# Patient Record
Sex: FEMALE | Race: OTHER
[De-identification: names, ages, dates, MRNs, and addresses within clinical notes are randomized per-mention and may not be internally consistent; named-entity substitution may affect disease eponyms.]

---

## 2018-11-29 ENCOUNTER — APPOINTMENT (OUTPATIENT)
Dept: NEUROLOGY | Facility: CLINIC | Age: 18
End: 2018-11-29

## 2019-07-24 ENCOUNTER — HOSPITAL ENCOUNTER (EMERGENCY)
Dept: HOSPITAL 74 - JERFT | Age: 19
Discharge: HOME | End: 2019-07-24
Payer: COMMERCIAL

## 2019-07-24 VITALS — BODY MASS INDEX: 23.1 KG/M2

## 2019-07-24 VITALS — SYSTOLIC BLOOD PRESSURE: 108 MMHG | HEART RATE: 83 BPM | TEMPERATURE: 98.6 F | DIASTOLIC BLOOD PRESSURE: 72 MMHG

## 2019-07-24 DIAGNOSIS — Z94.0: ICD-10-CM

## 2019-07-24 DIAGNOSIS — R51: Primary | ICD-10-CM

## 2019-07-24 NOTE — PDOC
History of Present Illness





- General


Chief Complaint: Headache


Stated Complaint: HEADACHE


Time Seen by Provider: 07/24/19 10:11


History Source: Patient, Parent(s)





- History of Present Illness


Timing/Duration: reports: other





Past History





- Past Medical History


Allergies/Adverse Reactions: 


 Allergies











Allergy/AdvReac Type Severity Reaction Status Date / Time


 


No Known Allergies Allergy   Verified 07/24/19 10:13











Home Medications: 


Ambulatory Orders





NK [No Known Home Medication]  07/24/19 








COPD: No


Kidney Stones:  (only has one kidney)


Other medical history: migranes





- Surgical History


Abdominal Surgery: Yes (kidney transplant, re-implantation of bladder as an 

infant)





- Suicide/Smoking/Psychosocial Hx


Smoking History: Never smoked


Hx Alcohol Use: No


Drug/Substance Use Hx: No





**Review of Systems





- Review of Systems


HEENTM: No: Blurred Vision


Neurological: Yes: Headache.  No: Numbness, Weakness, Dizziness





*Physical Exam





- Vital Signs


 Last Vital Signs











Temp Pulse Resp BP Pulse Ox


 


 98.6 F   83   18   108/72   98 


 


 07/24/19 10:10  07/24/19 10:10  07/24/19 10:10  07/24/19 10:10  07/24/19 10:10














- Physical Exam


General Appearance: Yes: Appropriately Dressed.  No: Apparent Distress


HEENT: positive: Normal Voice


Neck: positive: Supple


Respiratory/Chest: negative: Respiratory Distress


Neurologic: positive: CNs II-XII NML intact, Fully Oriented, Alert, Normal Mood/

Affect, Motor Strength 5/5





Medical Decision Making





- Medical Decision Making





07/24/19 10:25


18 yo F, endorses history of migraines with negative neuroimaging in the past, 

follows up with neuro and currently on medication for migraine to take as needed

, but states medication has never relieved her headaches.  Does not remember 

name of meds at this point.  Came in today because started having pressure in 

the back of her head since last night that felt somewhat different from her 

migraines though admits that her her migraines is usually located in different 

locations.  No dizziness, nausea, vomiting or visual changes at this time.  Pt 

states her neuro recommended botox but pt and mother currently refusing 

procedure. States she gets almost daily HAs. Did not try anything new for 

current headache





See exam





HA


H/o migraines


Neg neuroimaging in past


Gets almost daily HAs


On home meds prn HA but does not help


Refusing botox rec by her neuro


Well goldy w/ intact neuro exam


-dose of motrin given here


-To f/u with her neurologist





*DC/Admit/Observation/Transfer


Diagnosis at time of Disposition: 


 H/O migraine





Headache


Qualifiers:


 Headache type: unspecified Headache chronicity pattern: chronic headache 

Intractability: not intractable Qualified Code(s): R51 - Headache








- Discharge Dispostion


Disposition: HOME


Condition at time of disposition: Good





- Referrals





- Patient Instructions


Additional Instructions: 


Take motrin, tylenol or exedrine for headache as needed and follow up with your 

neurologist this week for further management





- Post Discharge Activity

## 2021-01-19 ENCOUNTER — TRANSCRIPTION ENCOUNTER (OUTPATIENT)
Age: 21
End: 2021-01-19

## 2021-01-19 ENCOUNTER — APPOINTMENT (OUTPATIENT)
Dept: GASTROENTEROLOGY | Facility: CLINIC | Age: 21
End: 2021-01-19
Payer: COMMERCIAL

## 2021-01-19 VITALS
BODY MASS INDEX: 23.56 KG/M2 | DIASTOLIC BLOOD PRESSURE: 72 MMHG | HEART RATE: 106 BPM | OXYGEN SATURATION: 98 % | SYSTOLIC BLOOD PRESSURE: 106 MMHG | HEIGHT: 64 IN | TEMPERATURE: 98.9 F | WEIGHT: 138 LBS

## 2021-01-19 DIAGNOSIS — K25.9 GASTRIC ULCER, UNSPECIFIED AS ACUTE OR CHRONIC, W/OUT HEMORRHAGE OR PERFORATION: ICD-10-CM

## 2021-01-19 PROCEDURE — 99072 ADDL SUPL MATRL&STAF TM PHE: CPT

## 2021-01-19 PROCEDURE — 99204 OFFICE O/P NEW MOD 45 MIN: CPT | Mod: 25

## 2021-01-19 PROCEDURE — 36415 COLL VENOUS BLD VENIPUNCTURE: CPT

## 2021-01-19 NOTE — PHYSICAL EXAM
[General Appearance - Alert] : alert [General Appearance - In No Acute Distress] : in no acute distress [Sclera] : the sclera and conjunctiva were normal [Outer Ear] : the ears and nose were normal in appearance [Neck Appearance] : the appearance of the neck was normal [] : no respiratory distress [Abdomen Soft] : soft [Abnormal Walk] : normal gait [Cranial Nerves] : cranial nerves 2-12 were intact [Skin Color & Pigmentation] : normal skin color and pigmentation [Oriented To Time, Place, And Person] : oriented to person, place, and time

## 2021-01-19 NOTE — ASSESSMENT
[FreeTextEntry1] : Will check labs as listed, r/o secondary causes of gastric ulcers such as a gastrinoma.\par \par Will plan on an upper endoscopy for gastric ulcers.  Explained risks/benefits/alternatives including not limited to bleeding, infection, perforation, missed lesions, anesthesia complications.  Patient understands and agrees, all questions answered.\par

## 2021-01-19 NOTE — HISTORY OF PRESENT ILLNESS
[FreeTextEntry1] : Ms. Person is a pleasant 20F h/o kidney/bladder surgery as a child who presents to [facility] care.  She states she was seen by Dr. Elizabeth Mehta in November 2020 for burning, at times severe upper abdominal pain. She underwent an EGD which as per her report showed 3 gastric ulcers. She was not told of an H. pylor infection or other abnormalities on biopsy.\par \par She was placed on pantoprazole without improvement in her symptoms.\par \par She does have nausea when the pain is severe, no vomiting.  Occasional alternating constipation or diarrhea, no black stool or blood in her stool, no mucus.\par \par No weight change.\par \par No NSAIDs.\par \par Does not smoke or drink.\par \par No family h/o any GI malignancies.\par \par She stopped her pantoprazole several weeks ago due to lack of improvement in her symptoms.\par \par She was told she would need a repeat EGD to confirm healing, however wanted a second opinion.\par \par She is seeking a new PCP.

## 2021-01-20 LAB
ALBUMIN SERPL ELPH-MCNC: 4.9 G/DL
ALP BLD-CCNC: 77 U/L
ALT SERPL-CCNC: 22 U/L
ANION GAP SERPL CALC-SCNC: 14 MMOL/L
AST SERPL-CCNC: 24 U/L
BASOPHILS # BLD AUTO: 0.05 K/UL
BASOPHILS NFR BLD AUTO: 0.6 %
BILIRUB SERPL-MCNC: 0.4 MG/DL
BUN SERPL-MCNC: 11 MG/DL
CALCIUM SERPL-MCNC: 10 MG/DL
CHLORIDE SERPL-SCNC: 105 MMOL/L
CO2 SERPL-SCNC: 23 MMOL/L
CREAT SERPL-MCNC: 0.72 MG/DL
EOSINOPHIL # BLD AUTO: 0.15 K/UL
EOSINOPHIL NFR BLD AUTO: 1.9 %
GLUCOSE SERPL-MCNC: 97 MG/DL
HCT VFR BLD CALC: 46.7 %
HGB BLD-MCNC: 14.9 G/DL
IMM GRANULOCYTES NFR BLD AUTO: 0.3 %
LYMPHOCYTES # BLD AUTO: 2.01 K/UL
LYMPHOCYTES NFR BLD AUTO: 26.1 %
MAN DIFF?: NORMAL
MCHC RBC-ENTMCNC: 29.2 PG
MCHC RBC-ENTMCNC: 31.9 GM/DL
MCV RBC AUTO: 91.6 FL
MONOCYTES # BLD AUTO: 0.51 K/UL
MONOCYTES NFR BLD AUTO: 6.6 %
NEUTROPHILS # BLD AUTO: 4.97 K/UL
NEUTROPHILS NFR BLD AUTO: 64.5 %
PLATELET # BLD AUTO: 402 K/UL
POTASSIUM SERPL-SCNC: 4.3 MMOL/L
PROT SERPL-MCNC: 7.4 G/DL
RBC # BLD: 5.1 M/UL
RBC # FLD: 13 %
SODIUM SERPL-SCNC: 142 MMOL/L
WBC # FLD AUTO: 7.71 K/UL

## 2021-01-25 LAB — GASTRIN SERPL-MCNC: 33 PG/ML

## 2021-01-27 LAB — CGA SERPL-MCNC: 36.5 NG/ML

## 2021-02-20 ENCOUNTER — RESULT REVIEW (OUTPATIENT)
Age: 21
End: 2021-02-20

## 2021-02-22 ENCOUNTER — RESULT REVIEW (OUTPATIENT)
Age: 21
End: 2021-02-22

## 2021-02-23 ENCOUNTER — RESULT REVIEW (OUTPATIENT)
Age: 21
End: 2021-02-23

## 2021-02-23 ENCOUNTER — APPOINTMENT (OUTPATIENT)
Dept: GASTROENTEROLOGY | Facility: HOSPITAL | Age: 21
End: 2021-02-23

## 2021-07-26 ENCOUNTER — APPOINTMENT (OUTPATIENT)
Dept: OTOLARYNGOLOGY | Facility: CLINIC | Age: 21
End: 2021-07-26

## 2022-03-08 ENCOUNTER — TRANSCRIPTION ENCOUNTER (OUTPATIENT)
Age: 22
End: 2022-03-08

## 2022-04-04 ENCOUNTER — APPOINTMENT (OUTPATIENT)
Dept: GASTROENTEROLOGY | Facility: CLINIC | Age: 22
End: 2022-04-04

## 2023-01-17 ENCOUNTER — APPOINTMENT (OUTPATIENT)
Dept: INTERNAL MEDICINE | Facility: CLINIC | Age: 23
End: 2023-01-17

## 2023-08-04 ENCOUNTER — APPOINTMENT (OUTPATIENT)
Dept: GASTROENTEROLOGY | Facility: CLINIC | Age: 23
End: 2023-08-04
Payer: COMMERCIAL

## 2023-08-04 VITALS
DIASTOLIC BLOOD PRESSURE: 70 MMHG | SYSTOLIC BLOOD PRESSURE: 110 MMHG | BODY MASS INDEX: 23.56 KG/M2 | WEIGHT: 138 LBS | HEIGHT: 64 IN

## 2023-08-04 PROCEDURE — 99214 OFFICE O/P EST MOD 30 MIN: CPT

## 2023-08-04 NOTE — ASSESSMENT
[FreeTextEntry1] : Very likely symptomatic cholelithiasis.  Will start on omeprazole 20mg daily to r/o acid related disease.  Will refer to Dr. Castillo for evaluation for a cholecystectomy.

## 2023-08-04 NOTE — HISTORY OF PRESENT ILLNESS
[FreeTextEntry1] : Ms. Person is a pleasant 23F h/o kidney/bladder surgery as a child, ? gastric ulcer several years ago who returns for f/u.  She was seen by myself 1/21, underwent an EGD 2/21 showing only a fundic gland polyp, no H. pylori on biopsies.  She gained some weight over the past year. Has been c/o epigastric pain occasionally radiating to the RUQ starting 3/23, has been persistent since, sharp, not colicky.  Worsened by fatty food, improves only with sparkling water.  No jaundice, icterus, dark urine, pale stool.  No N/V.  Has not tried any acid lowering medication.  No other changes.  Had an abd U/S (reviewed) on 7/10 showing cholelithiasis, otherwise unremarkable.

## 2023-08-15 ENCOUNTER — NON-APPOINTMENT (OUTPATIENT)
Age: 23
End: 2023-08-15

## 2023-08-15 ENCOUNTER — APPOINTMENT (OUTPATIENT)
Dept: SURGERY | Facility: CLINIC | Age: 23
End: 2023-08-15
Payer: COMMERCIAL

## 2023-08-15 VITALS
WEIGHT: 156 LBS | OXYGEN SATURATION: 98 % | BODY MASS INDEX: 26.63 KG/M2 | DIASTOLIC BLOOD PRESSURE: 79 MMHG | RESPIRATION RATE: 18 BRPM | HEIGHT: 64 IN | HEART RATE: 80 BPM | SYSTOLIC BLOOD PRESSURE: 115 MMHG

## 2023-08-15 DIAGNOSIS — K80.20 CALCULUS OF GALLBLADDER W/OUT CHOLECYSTITIS W/OUT OBSTRUCTION: ICD-10-CM

## 2023-08-15 PROCEDURE — 99203 OFFICE O/P NEW LOW 30 MIN: CPT

## 2023-08-17 PROBLEM — K80.20 SYMPTOMATIC CHOLELITHIASIS: Status: ACTIVE | Noted: 2023-08-04

## 2023-08-17 NOTE — PHYSICAL EXAM
[Abdomen Masses] : No abdominal masses [Abdomen Tenderness] : ~T No ~M abdominal tenderness [JVD] : no jugular venous distention  [No Rash or Lesion] : No rash or lesion [Alert] : alert [de-identified] : No acute distress

## 2023-08-17 NOTE — ASSESSMENT
[FreeTextEntry1] :  Ms. RAY is a 23 year female who presents for symptomatic cholelithiasis.   Based on current symptoms and prior findings of gallstones on imaging recommend proceeding with laparoscopic cholecystectomy. Discussed operative approaches to cholecystectomy including open, laparoscopic and robotic surgery. Patient would like to proceed with laparoscopic cholecystectomy. Risks and benefits of surgery discussed including infection, bleeding, bile duct injury, bile leak. Long term risks discussed include postprandial pain, incisional hernia and bile salt induced diarrhea.  Patients questions answered to her satisfaction.   Will get presurgical testing and labs for preoperative risk assessment.  Will schedule for laparoscopic cholecystectomy, possible open surgery.

## 2023-08-17 NOTE — DATA REVIEWED
[FreeTextEntry1] : Outside ultrasound report reviewed showing gallstones and an otherwise normal gallbladder with no common bile duct dilatation.

## 2023-08-17 NOTE — HISTORY OF PRESENT ILLNESS
[FreeTextEntry1] : 23-year-old female here for initial evaluation for symptomatic cholelithiasis.  Patient had an ultrasound at [facility] in July which shows gallstones and a normal common bile duct.  Patient has been having epigastric pain with meals.  Occasionally pain on the right side.  Given the persistent symptoms she is interested in pursuing cholecystectomy.  She was seen by Dr. Thibodeaux who referred her here for surgery discussion.  She previously underwent endoscopy with Dr. Thibodeaux to rule out other sources of epigastric pain.

## 2023-08-21 ENCOUNTER — RESULT REVIEW (OUTPATIENT)
Age: 23
End: 2023-08-21

## 2023-08-24 ENCOUNTER — RESULT REVIEW (OUTPATIENT)
Age: 23
End: 2023-08-24

## 2023-08-25 ENCOUNTER — APPOINTMENT (OUTPATIENT)
Dept: SURGERY | Facility: HOSPITAL | Age: 23
End: 2023-08-25

## 2023-08-25 ENCOUNTER — RESULT REVIEW (OUTPATIENT)
Age: 23
End: 2023-08-25

## 2023-08-26 ENCOUNTER — TRANSCRIPTION ENCOUNTER (OUTPATIENT)
Age: 23
End: 2023-08-26

## 2023-09-12 ENCOUNTER — APPOINTMENT (OUTPATIENT)
Dept: SURGERY | Facility: CLINIC | Age: 23
End: 2023-09-12
Payer: COMMERCIAL

## 2023-09-12 VITALS
DIASTOLIC BLOOD PRESSURE: 85 MMHG | WEIGHT: 150.2 LBS | BODY MASS INDEX: 25.64 KG/M2 | SYSTOLIC BLOOD PRESSURE: 132 MMHG | OXYGEN SATURATION: 99 % | HEIGHT: 64 IN | RESPIRATION RATE: 18 BRPM | HEART RATE: 94 BPM

## 2023-09-12 PROCEDURE — XXXXX: CPT | Mod: 1L

## 2023-10-11 ENCOUNTER — NON-APPOINTMENT (OUTPATIENT)
Age: 23
End: 2023-10-11

## 2024-03-04 ENCOUNTER — APPOINTMENT (OUTPATIENT)
Dept: FAMILY MEDICINE | Facility: CLINIC | Age: 24
End: 2024-03-04
Payer: COMMERCIAL

## 2024-03-04 VITALS
SYSTOLIC BLOOD PRESSURE: 120 MMHG | HEIGHT: 64 IN | WEIGHT: 157 LBS | BODY MASS INDEX: 26.8 KG/M2 | DIASTOLIC BLOOD PRESSURE: 72 MMHG | HEART RATE: 94 BPM | OXYGEN SATURATION: 99 %

## 2024-03-04 DIAGNOSIS — Z00.00 ENCOUNTER FOR GENERAL ADULT MEDICAL EXAMINATION W/OUT ABNORMAL FINDINGS: ICD-10-CM

## 2024-03-04 PROCEDURE — 36415 COLL VENOUS BLD VENIPUNCTURE: CPT

## 2024-03-04 PROCEDURE — 99385 PREV VISIT NEW AGE 18-39: CPT

## 2024-03-04 RX ORDER — OMEPRAZOLE 20 MG/1
20 CAPSULE, DELAYED RELEASE ORAL
Qty: 60 | Refills: 1 | Status: DISCONTINUED | COMMUNITY
Start: 2023-08-04 | End: 2024-03-04

## 2024-03-04 NOTE — PHYSICAL EXAM
[No Acute Distress] : no acute distress [Well Nourished] : well nourished [Well Developed] : well developed [Well-Appearing] : well-appearing [Normal Sclera/Conjunctiva] : normal sclera/conjunctiva [PERRL] : pupils equal round and reactive to light [EOMI] : extraocular movements intact [Normal Outer Ear/Nose] : the outer ears and nose were normal in appearance [Normal Oropharynx] : the oropharynx was normal [No JVD] : no jugular venous distention [No Lymphadenopathy] : no lymphadenopathy [Thyroid Normal, No Nodules] : the thyroid was normal and there were no nodules present [Supple] : supple [No Respiratory Distress] : no respiratory distress  [No Accessory Muscle Use] : no accessory muscle use [Clear to Auscultation] : lungs were clear to auscultation bilaterally [Regular Rhythm] : with a regular rhythm [Normal Rate] : normal rate  [Normal S1, S2] : normal S1 and S2 [No Murmur] : no murmur heard [No Carotid Bruits] : no carotid bruits [No Abdominal Bruit] : a ~M bruit was not heard ~T in the abdomen [Pedal Pulses Present] : the pedal pulses are present [No Varicosities] : no varicosities [No Palpable Aorta] : no palpable aorta [No Edema] : there was no peripheral edema [No Extremity Clubbing/Cyanosis] : no extremity clubbing/cyanosis [Non Tender] : non-tender [Soft] : abdomen soft [No Masses] : no abdominal mass palpated [Non-distended] : non-distended [Normal Bowel Sounds] : normal bowel sounds [No HSM] : no HSM [Normal Posterior Cervical Nodes] : no posterior cervical lymphadenopathy [No CVA Tenderness] : no CVA  tenderness [Normal Anterior Cervical Nodes] : no anterior cervical lymphadenopathy [No Spinal Tenderness] : no spinal tenderness [No Joint Swelling] : no joint swelling [Grossly Normal Strength/Tone] : grossly normal strength/tone [No Rash] : no rash [Coordination Grossly Intact] : coordination grossly intact [No Focal Deficits] : no focal deficits [Normal Gait] : normal gait [Deep Tendon Reflexes (DTR)] : deep tendon reflexes were 2+ and symmetric [Normal Affect] : the affect was normal [Normal Insight/Judgement] : insight and judgment were intact

## 2024-03-05 DIAGNOSIS — E55.9 VITAMIN D DEFICIENCY, UNSPECIFIED: ICD-10-CM

## 2024-03-05 LAB
25(OH)D3 SERPL-MCNC: 12.3 NG/ML
ALBUMIN SERPL ELPH-MCNC: 4.8 G/DL
ALP BLD-CCNC: 70 U/L
ALT SERPL-CCNC: 16 U/L
ANION GAP SERPL CALC-SCNC: 14 MMOL/L
AST SERPL-CCNC: 20 U/L
BILIRUB SERPL-MCNC: 0.3 MG/DL
BUN SERPL-MCNC: 11 MG/DL
C TRACH RRNA SPEC QL NAA+PROBE: NOT DETECTED
CALCIUM SERPL-MCNC: 10 MG/DL
CHLORIDE SERPL-SCNC: 101 MMOL/L
CHOLEST SERPL-MCNC: 207 MG/DL
CO2 SERPL-SCNC: 25 MMOL/L
CREAT SERPL-MCNC: 0.6 MG/DL
EGFR: 127 ML/MIN/1.73M2
ESTIMATED AVERAGE GLUCOSE: 103 MG/DL
FOLATE SERPL-MCNC: 5.3 NG/ML
GLUCOSE SERPL-MCNC: 80 MG/DL
HBA1C MFR BLD HPLC: 5.2 %
HCV AB SER QL: NONREACTIVE
HCV S/CO RATIO: 0.16 S/CO
HDLC SERPL-MCNC: 61 MG/DL
HIV1+2 AB SPEC QL IA.RAPID: NONREACTIVE
LDLC SERPL CALC-MCNC: 122 MG/DL
N GONORRHOEA RRNA SPEC QL NAA+PROBE: NOT DETECTED
NONHDLC SERPL-MCNC: 147 MG/DL
POTASSIUM SERPL-SCNC: 4.4 MMOL/L
PROT SERPL-MCNC: 7.5 G/DL
SODIUM SERPL-SCNC: 140 MMOL/L
SOURCE AMPLIFICATION: NORMAL
T PALLIDUM AB SER QL IA: NEGATIVE
TRIGL SERPL-MCNC: 141 MG/DL
TSH SERPL-ACNC: 1.32 UIU/ML
VIT B12 SERPL-MCNC: 519 PG/ML

## 2024-03-05 RX ORDER — ERGOCALCIFEROL 1.25 MG/1
1.25 MG CAPSULE, LIQUID FILLED ORAL
Qty: 12 | Refills: 0 | Status: ACTIVE | COMMUNITY
Start: 2024-03-05 | End: 1900-01-01

## 2024-03-05 NOTE — HEALTH RISK ASSESSMENT
[Good] : ~his/her~  mood as  good [Yes] : Yes [1 or 2 (0 pts)] : 1 or 2 (0 points) [2 - 4 times a month (2 pts)] : 2-4 times a month (2 points) [No falls in past year] : Patient reported no falls in the past year [No] : In the past 12 months have you used drugs other than those required for medical reasons? No [0] : 1) Little interest or pleasure doing things: Not at all (0) [Patient reported PAP Smear was normal] : Patient reported PAP Smear was normal [Fully functional (bathing, dressing, toileting, transferring, walking, feeding)] : Fully functional (bathing, dressing, toileting, transferring, walking, feeding) [Fully functional (using the telephone, shopping, preparing meals, housekeeping, doing laundry, using] : Fully functional and needs no help or supervision to perform IADLs (using the telephone, shopping, preparing meals, housekeeping, doing laundry, using transportation, managing medications and managing finances) [Never] : Never [Never (0 pts)] : Never (0 points) [PHQ-2 Negative - No further assessment needed] : PHQ-2 Negative - No further assessment needed [HIV test declined] : HIV test declined [Hepatitis C test declined] : Hepatitis C test declined [None] : None [With Family] : lives with family [# of Members in Household ___] :  household currently consist of [unfilled] member(s) [Employed] : employed [College] : College [Student] : student [Significant Other] : lives with significant other [Sexually Active] : sexually active [Audit-CScore] : 2 [de-identified] : none [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

## 2024-03-05 NOTE — ASSESSMENT
[FreeTextEntry1] : 24 year old female presented for an annual physical exam.  routine blood work today, blood collected in the office. up to date with screening STI screening today. will call back with results.

## 2024-03-05 NOTE — HISTORY OF PRESENT ILLNESS
[FreeTextEntry1] : Annual and establish care  [de-identified] : 23 year old female presenting to [facility] and for a physical exam. Patient is doing well and has no concerns today.  PMH: Asthma (albuterol ocassionally)  PSH: Cholecystectomy 2023, right kidney partial nephrectomy 2008 due to cysts, 2 urethra s/p reimplanted bladder 2000.  FMH: Mother - DM, HLD, HTN, Thyroid disease. Father - Asthma. Grandfathers - MI, DM.  SH: [facility]et clinic  Meds: Albuterol prn, symbicort  Allergies: NKDA

## 2024-03-07 ENCOUNTER — NON-APPOINTMENT (OUTPATIENT)
Age: 24
End: 2024-03-07

## 2024-03-18 ENCOUNTER — APPOINTMENT (OUTPATIENT)
Dept: FAMILY MEDICINE | Facility: CLINIC | Age: 24
End: 2024-03-18

## 2024-03-25 ENCOUNTER — APPOINTMENT (OUTPATIENT)
Dept: FAMILY MEDICINE | Facility: CLINIC | Age: 24
End: 2024-03-25

## 2024-04-02 ENCOUNTER — APPOINTMENT (OUTPATIENT)
Dept: GASTROENTEROLOGY | Facility: CLINIC | Age: 24
End: 2024-04-02
Payer: COMMERCIAL

## 2024-04-02 VITALS
OXYGEN SATURATION: 95 % | HEIGHT: 64 IN | BODY MASS INDEX: 26.8 KG/M2 | DIASTOLIC BLOOD PRESSURE: 82 MMHG | HEART RATE: 89 BPM | WEIGHT: 157 LBS | SYSTOLIC BLOOD PRESSURE: 114 MMHG

## 2024-04-02 DIAGNOSIS — K62.5 HEMORRHAGE OF ANUS AND RECTUM: ICD-10-CM

## 2024-04-02 PROCEDURE — 99214 OFFICE O/P EST MOD 30 MIN: CPT

## 2024-04-02 RX ORDER — SODIUM SULFATE, POTASSIUM SULFATE AND MAGNESIUM SULFATE 1.6; 3.13; 17.5 G/177ML; G/177ML; G/177ML
17.5-3.13-1.6 SOLUTION ORAL
Qty: 1 | Refills: 0 | Status: ACTIVE | COMMUNITY
Start: 2024-04-02 | End: 1900-01-01

## 2024-04-02 NOTE — ASSESSMENT
[FreeTextEntry1] : Will plan on a colonoscopy for rectal bleeding.  Explained risks/benefits/alternatives including not limited to bleeding, infection, perforation, missed lesions, anesthesia complications.  Patient understands and agrees, all questions answered.  Will use a split dose Suprep prep with clears the day prior.

## 2024-04-02 NOTE — HISTORY OF PRESENT ILLNESS
[FreeTextEntry1] : Ms. Person is a pleasant 24F h/o kidney/bladder Sx as a child, cholecystectomy who returns for f/u. Over the past several months she has been intermittently seeing BRB when wiping. This past week she saw a large amount of bright red blood in the toilet when having a BM, blood was mixed with brown stool. Stool was formed, brown, no clots. No straining, no hard stool, no diarrhea. No abd pain. No N/V. No weight change. Has continued to see blood when wiping. No medications. Has never undergone a colonoscopy. Does not smoke or drink. No FHx of any GI malignancies.

## 2024-04-04 LAB
BASOPHILS # BLD AUTO: 0.07 K/UL
BASOPHILS NFR BLD AUTO: 1 %
EOSINOPHIL # BLD AUTO: 0.2 K/UL
EOSINOPHIL NFR BLD AUTO: 2.7 %
HCT VFR BLD CALC: 45.3 %
HGB BLD-MCNC: 14.9 G/DL
IMM GRANULOCYTES NFR BLD AUTO: 0.1 %
LYMPHOCYTES # BLD AUTO: 1.96 K/UL
LYMPHOCYTES NFR BLD AUTO: 26.9 %
MAN DIFF?: NORMAL
MCHC RBC-ENTMCNC: 29.6 PG
MCHC RBC-ENTMCNC: 32.9 GM/DL
MCV RBC AUTO: 90.1 FL
MONOCYTES # BLD AUTO: 0.52 K/UL
MONOCYTES NFR BLD AUTO: 7.1 %
NEUTROPHILS # BLD AUTO: 4.53 K/UL
NEUTROPHILS NFR BLD AUTO: 62.2 %
PLATELET # BLD AUTO: 417 K/UL
RBC # BLD: 5.03 M/UL
RBC # FLD: 13.2 %
WBC # FLD AUTO: 7.29 K/UL

## 2024-04-08 ENCOUNTER — APPOINTMENT (OUTPATIENT)
Dept: FAMILY MEDICINE | Facility: CLINIC | Age: 24
End: 2024-04-08

## 2024-05-05 ENCOUNTER — NON-APPOINTMENT (OUTPATIENT)
Age: 24
End: 2024-05-05

## 2024-06-13 RX ORDER — SODIUM SULFATE, MAGNESIUM SULFATE, AND POTASSIUM CHLORIDE 17.75; 2.7; 2.25 G/1; G/1; G/1
1479-225-188 TABLET ORAL
Qty: 1 | Refills: 0 | Status: ACTIVE | COMMUNITY
Start: 2024-06-13 | End: 1900-01-01

## 2024-06-24 ENCOUNTER — APPOINTMENT (OUTPATIENT)
Dept: OBGYN | Facility: CLINIC | Age: 24
End: 2024-06-24
Payer: COMMERCIAL

## 2024-06-24 VITALS
HEIGHT: 64 IN | BODY MASS INDEX: 26.46 KG/M2 | SYSTOLIC BLOOD PRESSURE: 118 MMHG | DIASTOLIC BLOOD PRESSURE: 78 MMHG | WEIGHT: 155 LBS

## 2024-06-24 DIAGNOSIS — Z83.42 FAMILY HISTORY OF FAMILIAL HYPERCHOLESTEROLEMIA: ICD-10-CM

## 2024-06-24 DIAGNOSIS — Z83.49 FAMILY HISTORY OF OTHER ENDOCRINE, NUTRITIONAL AND METABOLIC DISEASES: ICD-10-CM

## 2024-06-24 DIAGNOSIS — Z83.3 FAMILY HISTORY OF DIABETES MELLITUS: ICD-10-CM

## 2024-06-24 DIAGNOSIS — Z01.419 ENCOUNTER FOR GYNECOLOGICAL EXAMINATION (GENERAL) (ROUTINE) W/OUT ABNORMAL FINDINGS: ICD-10-CM

## 2024-06-24 DIAGNOSIS — Z82.5 FAMILY HISTORY OF ASTHMA AND OTHER CHRONIC LOWER RESPIRATORY DISEASES: ICD-10-CM

## 2024-06-24 DIAGNOSIS — J45.20 MILD INTERMITTENT ASTHMA, UNCOMPLICATED: ICD-10-CM

## 2024-06-24 DIAGNOSIS — Z11.3 ENCOUNTER FOR SCREENING FOR INFECTIONS WITH A PREDOMINANTLY SEXUAL MODE OF TRANSMISSION: ICD-10-CM

## 2024-06-24 DIAGNOSIS — Z82.49 FAMILY HISTORY OF ISCHEMIC HEART DISEASE AND OTHER DISEASES OF THE CIRCULATORY SYSTEM: ICD-10-CM

## 2024-06-24 PROCEDURE — 36415 COLL VENOUS BLD VENIPUNCTURE: CPT

## 2024-06-24 PROCEDURE — 99385 PREV VISIT NEW AGE 18-39: CPT

## 2024-06-25 LAB
C TRACH RRNA SPEC QL NAA+PROBE: NOT DETECTED
HBV SURFACE AG SER QL: NONREACTIVE
HCV AB SER QL: NONREACTIVE
HCV S/CO RATIO: 0.51 S/CO
HIV1+2 AB SPEC QL IA.RAPID: NONREACTIVE
HPV HIGH+LOW RISK DNA PNL CVX: NOT DETECTED
N GONORRHOEA RRNA SPEC QL NAA+PROBE: NOT DETECTED
SOURCE TP AMPLIFICATION: NORMAL
T PALLIDUM AB SER QL IA: NEGATIVE

## 2024-06-26 DIAGNOSIS — B96.89 ACUTE VAGINITIS: ICD-10-CM

## 2024-06-26 DIAGNOSIS — N76.0 ACUTE VAGINITIS: ICD-10-CM

## 2024-06-26 DIAGNOSIS — B37.9 CANDIDIASIS, UNSPECIFIED: ICD-10-CM

## 2024-06-26 LAB
CANDIDA VAG CYTO: DETECTED
G VAGINALIS+PREV SP MTYP VAG QL MICRO: DETECTED
T VAGINALIS VAG QL WET PREP: NOT DETECTED

## 2024-06-26 RX ORDER — METRONIDAZOLE 7.5 MG/G
0.75 GEL VAGINAL
Qty: 1 | Refills: 0 | Status: ACTIVE | COMMUNITY
Start: 2024-06-26 | End: 1900-01-01

## 2024-06-30 LAB — CYTOLOGY CVX/VAG DOC THIN PREP: ABNORMAL

## 2024-07-01 RX ORDER — METRONIDAZOLE 500 MG/1
500 TABLET ORAL TWICE DAILY
Qty: 14 | Refills: 0 | Status: ACTIVE | COMMUNITY
Start: 2024-07-01 | End: 1900-01-01

## 2024-08-05 ENCOUNTER — RESULT REVIEW (OUTPATIENT)
Age: 24
End: 2024-08-05

## 2024-08-06 ENCOUNTER — RESULT REVIEW (OUTPATIENT)
Age: 24
End: 2024-08-06

## 2024-08-06 ENCOUNTER — APPOINTMENT (OUTPATIENT)
Dept: GASTROENTEROLOGY | Facility: HOSPITAL | Age: 24
End: 2024-08-06

## 2024-08-11 ENCOUNTER — NON-APPOINTMENT (OUTPATIENT)
Age: 24
End: 2024-08-11

## 2024-08-11 PROBLEM — Z87.19 HISTORY OF RECTAL BLEEDING: Status: RESOLVED | Noted: 2024-04-02 | Resolved: 2024-08-11

## 2024-08-11 PROBLEM — K29.70 GASTRITIS: Status: ACTIVE | Noted: 2024-08-11

## 2024-08-11 PROBLEM — Z87.11 HISTORY OF GASTRIC ULCER: Status: RESOLVED | Noted: 2021-01-19 | Resolved: 2024-08-11

## 2024-08-29 ENCOUNTER — APPOINTMENT (OUTPATIENT)
Dept: FAMILY MEDICINE | Facility: CLINIC | Age: 24
End: 2024-08-29
Payer: COMMERCIAL

## 2024-08-29 VITALS
BODY MASS INDEX: 25.27 KG/M2 | HEART RATE: 96 BPM | WEIGHT: 148 LBS | DIASTOLIC BLOOD PRESSURE: 70 MMHG | OXYGEN SATURATION: 99 % | SYSTOLIC BLOOD PRESSURE: 100 MMHG | HEIGHT: 64 IN

## 2024-08-29 DIAGNOSIS — E16.2 HYPOGLYCEMIA, UNSPECIFIED: ICD-10-CM

## 2024-08-29 DIAGNOSIS — J45.909 UNSPECIFIED ASTHMA, UNCOMPLICATED: ICD-10-CM

## 2024-08-29 PROCEDURE — 36415 COLL VENOUS BLD VENIPUNCTURE: CPT

## 2024-08-29 PROCEDURE — 99214 OFFICE O/P EST MOD 30 MIN: CPT

## 2024-08-29 RX ORDER — BUDESONIDE AND FORMOTEROL FUMARATE DIHYDRATE 80; 4.5 UG/1; UG/1
80-4.5 AEROSOL RESPIRATORY (INHALATION)
Refills: 0 | Status: ACTIVE | COMMUNITY
Start: 2024-08-29

## 2024-08-29 RX ORDER — PSYLLIUM HUSK 0.4 G
CAPSULE ORAL
Refills: 0 | Status: ACTIVE | COMMUNITY

## 2024-08-29 NOTE — HISTORY OF PRESENT ILLNESS
[FreeTextEntry8] : 24 year old female new to provider here for recent episodes of hypoglycemia.  Reports history of intermittent asthma, gastric ulcer, rectal bleeding, congenital polycystic kidney disease right kidney partial nephrectomy 2008 due to cysts, 2 urethra s/p reimplanted bladder 2000.  She had morning glucose readings of 32 and 60 associated with dizziness, weakness, chest pain, palpitations since last week. States this past Saturday she had eggs and toast around 11am, then had small amount of pasta at 4pm. No other foods or snacks. Then 8:30am next morning sugar was 32.   Symptoms resolved after meals, juice intake.  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 Denies history of anorexia, laxative use, self-induced vomiting.  Past several days has increased her meals including lunch and dinner with fruit snacks in between. Minimal symptoms and glucose now improved to 90s.   Started metamucil in the past 1 week. No other new meds or supplements.  Denies any tobacco, alcohol use.   Recently had EGD/colonoscopy notable for gastritis.  Had COVID several weeks ago now resolved.

## 2024-08-29 NOTE — PHYSICAL EXAM
[No Edema] : there was no peripheral edema [Grossly Normal Strength/Tone] : grossly normal strength/tone [Normal] : affect was normal and insight and judgment were intact

## 2024-08-29 NOTE — ASSESSMENT
[FreeTextEntry1] : POCT glucose 72 in office today.  Discussed differential but very likely due to patient sporadic eating schedule and missing meals, going a day without intake.  Stressed the importance of regularly scheduled meals, snacking in between to maintain proper carb intake and glucose management.  Labs drawn to evaluate any underlying comorbidities. Prior labs including electrolytes, A1c, hepatic/renal function reassuring.  Advised to stop metamucil for now until symptoms resolve.  Provided with patient education regarding hypoglycemia symptoms, glucose monitoring, and return precautions.  If symptoms continue may need further evaluation with endo.  All questions answered, patient expressed understanding, and in agreement with plan.

## 2024-09-03 LAB
ALBUMIN SERPL ELPH-MCNC: 4.7 G/DL
ALP BLD-CCNC: 63 U/L
ALT SERPL-CCNC: 10 U/L
ANION GAP SERPL CALC-SCNC: 14 MMOL/L
AST SERPL-CCNC: 16 U/L
BASOPHILS # BLD AUTO: 0.06 K/UL
BASOPHILS NFR BLD AUTO: 0.8 %
BILIRUB SERPL-MCNC: 0.6 MG/DL
BUN SERPL-MCNC: 10 MG/DL
C PEPTIDE SERPL-MCNC: 1.5 NG/ML
CALCIUM SERPL-MCNC: 9.9 MG/DL
CHLORIDE SERPL-SCNC: 104 MMOL/L
CO2 SERPL-SCNC: 21 MMOL/L
CREAT SERPL-MCNC: 0.66 MG/DL
EGFR: 124 ML/MIN/1.73M2
EOSINOPHIL # BLD AUTO: 0.15 K/UL
EOSINOPHIL NFR BLD AUTO: 2.1 %
ESTIMATED AVERAGE GLUCOSE: 94 MG/DL
GLUCOSE SERPL-MCNC: 72 MG/DL
HBA1C MFR BLD HPLC: 4.9 %
HCT VFR BLD CALC: 48 %
HGB BLD-MCNC: 15.3 G/DL
IMM GRANULOCYTES NFR BLD AUTO: 0.1 %
LYMPHOCYTES # BLD AUTO: 1.62 K/UL
LYMPHOCYTES NFR BLD AUTO: 22.6 %
MAN DIFF?: NORMAL
MCHC RBC-ENTMCNC: 29.7 PG
MCHC RBC-ENTMCNC: 31.9 GM/DL
MCV RBC AUTO: 93 FL
MONOCYTES # BLD AUTO: 0.45 K/UL
MONOCYTES NFR BLD AUTO: 6.3 %
NEUTROPHILS # BLD AUTO: 4.87 K/UL
NEUTROPHILS NFR BLD AUTO: 68.1 %
PLATELET # BLD AUTO: 438 K/UL
POTASSIUM SERPL-SCNC: 4 MMOL/L
PROT SERPL-MCNC: 7.5 G/DL
RBC # BLD: 5.16 M/UL
RBC # FLD: 13.8 %
SODIUM SERPL-SCNC: 139 MMOL/L
TSH SERPL-ACNC: 0.97 UIU/ML
WBC # FLD AUTO: 7.16 K/UL

## 2024-09-24 ENCOUNTER — NON-APPOINTMENT (OUTPATIENT)
Age: 24
End: 2024-09-24

## 2024-10-29 ENCOUNTER — APPOINTMENT (OUTPATIENT)
Dept: ENDOCRINOLOGY | Facility: CLINIC | Age: 24
End: 2024-10-29

## 2024-10-29 VITALS
SYSTOLIC BLOOD PRESSURE: 114 MMHG | WEIGHT: 138 LBS | OXYGEN SATURATION: 98 % | HEART RATE: 91 BPM | BODY MASS INDEX: 23.56 KG/M2 | HEIGHT: 64 IN | DIASTOLIC BLOOD PRESSURE: 80 MMHG

## 2024-10-29 DIAGNOSIS — N92.6 IRREGULAR MENSTRUATION, UNSPECIFIED: ICD-10-CM

## 2024-10-29 PROCEDURE — 99204 OFFICE O/P NEW MOD 45 MIN: CPT

## 2024-10-29 PROCEDURE — G2211 COMPLEX E/M VISIT ADD ON: CPT | Mod: NC

## 2024-10-29 RX ORDER — ERGOCALCIFEROL 1.25 MG/1
1.25 MG CAPSULE ORAL
Qty: 13 | Refills: 3 | Status: ACTIVE | COMMUNITY
Start: 2024-10-29 | End: 1900-01-01

## 2024-12-10 ENCOUNTER — NON-APPOINTMENT (OUTPATIENT)
Age: 24
End: 2024-12-10

## 2025-02-28 ENCOUNTER — APPOINTMENT (OUTPATIENT)
Dept: ENDOCRINOLOGY | Facility: CLINIC | Age: 25
End: 2025-02-28

## 2025-03-17 ENCOUNTER — RESULT REVIEW (OUTPATIENT)
Age: 25
End: 2025-03-17

## 2025-03-17 ENCOUNTER — NON-APPOINTMENT (OUTPATIENT)
Age: 25
End: 2025-03-17

## 2025-03-24 ENCOUNTER — APPOINTMENT (OUTPATIENT)
Dept: ENDOCRINOLOGY | Facility: CLINIC | Age: 25
End: 2025-03-24
Payer: COMMERCIAL

## 2025-03-24 VITALS
WEIGHT: 128 LBS | OXYGEN SATURATION: 98 % | BODY MASS INDEX: 21.85 KG/M2 | DIASTOLIC BLOOD PRESSURE: 75 MMHG | HEIGHT: 64 IN | SYSTOLIC BLOOD PRESSURE: 107 MMHG | HEART RATE: 84 BPM

## 2025-03-24 DIAGNOSIS — Z86.39 PERSONAL HISTORY OF OTHER ENDOCRINE, NUTRITIONAL AND METABOLIC DISEASE: ICD-10-CM

## 2025-03-24 DIAGNOSIS — L65.9 NONSCARRING HAIR LOSS, UNSPECIFIED: ICD-10-CM

## 2025-03-24 DIAGNOSIS — N92.6 IRREGULAR MENSTRUATION, UNSPECIFIED: ICD-10-CM

## 2025-03-24 PROCEDURE — 99214 OFFICE O/P EST MOD 30 MIN: CPT

## 2025-03-24 PROCEDURE — G2211 COMPLEX E/M VISIT ADD ON: CPT | Mod: NC

## 2025-03-28 ENCOUNTER — APPOINTMENT (OUTPATIENT)
Dept: FAMILY MEDICINE | Facility: CLINIC | Age: 25
End: 2025-03-28
Payer: COMMERCIAL

## 2025-03-28 VITALS
BODY MASS INDEX: 21.85 KG/M2 | OXYGEN SATURATION: 100 % | WEIGHT: 128 LBS | DIASTOLIC BLOOD PRESSURE: 78 MMHG | SYSTOLIC BLOOD PRESSURE: 114 MMHG | HEIGHT: 64 IN | HEART RATE: 87 BPM | TEMPERATURE: 98.1 F

## 2025-03-28 DIAGNOSIS — Z00.00 ENCOUNTER FOR GENERAL ADULT MEDICAL EXAMINATION W/OUT ABNORMAL FINDINGS: ICD-10-CM

## 2025-03-28 PROCEDURE — 36415 COLL VENOUS BLD VENIPUNCTURE: CPT

## 2025-03-28 PROCEDURE — 99395 PREV VISIT EST AGE 18-39: CPT

## 2025-04-25 ENCOUNTER — NON-APPOINTMENT (OUTPATIENT)
Age: 25
End: 2025-04-25

## 2025-04-25 ENCOUNTER — APPOINTMENT (OUTPATIENT)
Dept: HEART AND VASCULAR | Facility: CLINIC | Age: 25
End: 2025-04-25
Payer: COMMERCIAL

## 2025-04-25 VITALS
HEART RATE: 74 BPM | TEMPERATURE: 98.2 F | SYSTOLIC BLOOD PRESSURE: 110 MMHG | HEIGHT: 64 IN | DIASTOLIC BLOOD PRESSURE: 64 MMHG | BODY MASS INDEX: 22.2 KG/M2 | OXYGEN SATURATION: 97 % | WEIGHT: 130 LBS | RESPIRATION RATE: 18 BRPM

## 2025-04-25 DIAGNOSIS — R07.89 OTHER CHEST PAIN: ICD-10-CM

## 2025-04-25 DIAGNOSIS — R00.2 PALPITATIONS: ICD-10-CM

## 2025-04-25 PROCEDURE — 93000 ELECTROCARDIOGRAM COMPLETE: CPT | Mod: 59

## 2025-04-25 PROCEDURE — 99203 OFFICE O/P NEW LOW 30 MIN: CPT

## 2025-04-25 PROCEDURE — 93246 EXT ECG>7D<15D RECORDING: CPT

## 2025-05-08 DIAGNOSIS — N92.6 IRREGULAR MENSTRUATION, UNSPECIFIED: ICD-10-CM

## 2025-05-08 RX ORDER — CHLORHEXIDINE GLUCONATE 4 %
325 (65 FE) LIQUID (ML) TOPICAL
Refills: 0 | Status: ACTIVE | COMMUNITY

## 2025-05-13 ENCOUNTER — NON-APPOINTMENT (OUTPATIENT)
Age: 25
End: 2025-05-13

## 2025-06-09 ENCOUNTER — APPOINTMENT (OUTPATIENT)
Dept: HEART AND VASCULAR | Facility: CLINIC | Age: 25
End: 2025-06-09

## 2025-06-25 RX ORDER — ALPRAZOLAM 0.5 MG/1
0.5 TABLET, ORALLY DISINTEGRATING ORAL
Qty: 2 | Refills: 0 | Status: ACTIVE | COMMUNITY
Start: 2025-06-25 | End: 1900-01-01

## 2025-07-11 ENCOUNTER — NON-APPOINTMENT (OUTPATIENT)
Age: 25
End: 2025-07-11